# Patient Record
Sex: FEMALE | Race: WHITE | Employment: OTHER | URBAN - NONMETROPOLITAN AREA
[De-identification: names, ages, dates, MRNs, and addresses within clinical notes are randomized per-mention and may not be internally consistent; named-entity substitution may affect disease eponyms.]

---

## 2020-09-24 RX ORDER — OMEPRAZOLE 20 MG/1
20 CAPSULE, DELAYED RELEASE ORAL DAILY
COMMUNITY

## 2020-09-24 RX ORDER — CHLORAL HYDRATE 500 MG
3000 CAPSULE ORAL 3 TIMES DAILY
COMMUNITY
End: 2020-09-29 | Stop reason: ALTCHOICE

## 2020-09-24 RX ORDER — CLONIDINE HYDROCHLORIDE 0.2 MG/1
0.2 TABLET ORAL NIGHTLY
COMMUNITY

## 2020-09-24 RX ORDER — ALBUTEROL SULFATE 90 UG/1
2 AEROSOL, METERED RESPIRATORY (INHALATION) EVERY 6 HOURS PRN
COMMUNITY
End: 2020-09-29 | Stop reason: ALTCHOICE

## 2020-09-24 RX ORDER — FLUTICASONE PROPIONATE 50 MCG
1 SPRAY, SUSPENSION (ML) NASAL DAILY
COMMUNITY

## 2020-09-24 RX ORDER — MECLIZINE HYDROCHLORIDE 25 MG/1
25 TABLET ORAL 3 TIMES DAILY PRN
COMMUNITY
End: 2020-09-29 | Stop reason: ALTCHOICE

## 2020-09-24 RX ORDER — GLIPIZIDE 10 MG/1
10 TABLET, FILM COATED, EXTENDED RELEASE ORAL 2 TIMES DAILY
COMMUNITY

## 2020-09-24 RX ORDER — OXYBUTYNIN CHLORIDE 5 MG/1
5 TABLET, EXTENDED RELEASE ORAL DAILY
COMMUNITY

## 2020-09-24 RX ORDER — LEVOTHYROXINE SODIUM 0.12 MG/1
125 TABLET ORAL DAILY
COMMUNITY

## 2020-09-24 RX ORDER — ASPIRIN 81 MG/1
81 TABLET ORAL DAILY
COMMUNITY

## 2020-09-24 SDOH — HEALTH STABILITY: MENTAL HEALTH: HOW OFTEN DO YOU HAVE A DRINK CONTAINING ALCOHOL?: NEVER

## 2020-09-29 ENCOUNTER — OFFICE VISIT (OUTPATIENT)
Dept: CARDIOLOGY | Age: 85
End: 2020-09-29
Payer: MEDICARE

## 2020-09-29 VITALS
SYSTOLIC BLOOD PRESSURE: 168 MMHG | BODY MASS INDEX: 34.53 KG/M2 | DIASTOLIC BLOOD PRESSURE: 82 MMHG | HEIGHT: 67 IN | WEIGHT: 220 LBS

## 2020-09-29 PROCEDURE — 99203 OFFICE O/P NEW LOW 30 MIN: CPT | Performed by: INTERNAL MEDICINE

## 2020-09-29 RX ORDER — INSULIN ASPART 100 [IU]/ML
INJECTION, SOLUTION INTRAVENOUS; SUBCUTANEOUS
COMMUNITY

## 2020-09-29 RX ORDER — HYDROCHLOROTHIAZIDE 25 MG/1
25 TABLET ORAL DAILY
COMMUNITY

## 2020-09-29 RX ORDER — CARVEDILOL 25 MG/1
25 TABLET ORAL 2 TIMES DAILY
Qty: 60 TABLET | Refills: 5 | Status: SHIPPED | OUTPATIENT
Start: 2020-09-29 | End: 2021-02-04 | Stop reason: SDUPTHER

## 2020-09-29 RX ORDER — LISINOPRIL 40 MG/1
40 TABLET ORAL DAILY
COMMUNITY

## 2020-09-29 NOTE — PROGRESS NOTES
Cardiology Office Visit Note  [facility]  [id number]  Phone: [phone]  Fax: [phone]                            Date:  9/29/2020  Patient: Bora Flowers  Age:  80 y.o., 1934    Referral: Brigitte Farooq MD        PRESENTATION: Bora Flowers is a 80y.o. year old female Is known to have history of type 2 diabetes, hypertension, hyperlipidemia, GERD, hypothyroidism    She was referred to establish with cardiology and to help manage her uncontrolled hypertension  She takes lisinopril, hydrochlorothiazide and beta-blocker for her blood pressure management, also she takes clonidine but her blood pressure still uncontrolled    She denies any active chest pain or shortness of breath or leg swelling  She is a former smoker and does not drink alcohol      REVIEW OF SYSTEMS:  Review of Systems   Constitutional: Positive for fatigue. Respiratory: Negative. Cardiovascular: Negative. Gastrointestinal: Negative. Endocrine: Negative. Genitourinary: Negative. Musculoskeletal: Negative. Skin: Negative. Neurological: Negative. Hematological: Negative. All other systems reviewed and are negative.       Past Medical History:      Diagnosis Date    Abdominal pain     Ankle fracture     Cellulitis     Chest pain     Diabetes (HCC)     Dizziness     Dorsalgia     Essential hypertension     Hyperlipidemia     Hypothyroidism     Low back pain     Seasonal allergies        Past Surgical History:      Procedure Laterality Date    ANKLE FRACTURE SURGERY      DILATION AND CURETTAGE OF UTERUS      FEMUR FRACTURE SURGERY      THYROIDECTOMY         Medications:  Current Outpatient Medications   Medication Sig Dispense Refill    lisinopril (PRINIVIL;ZESTRIL) 40 MG tablet Take 40 mg by mouth daily      insulin aspart (NOVOLOG FLEXPEN) 100 UNIT/ML injection pen Inject into the skin 3 times daily (before meals)      sertraline (ZOLOFT) 50 MG tablet murmur. Pulmonary:      Effort: Pulmonary effort is normal.      Breath sounds: Normal breath sounds. Abdominal:      General: Bowel sounds are normal.      Palpations: Abdomen is soft. Tenderness: There is no abdominal tenderness. Musculoskeletal: Normal range of motion. Right lower leg: No edema. Left lower leg: No edema. Skin:     General: Skin is warm. Neurological:      General: No focal deficit present. Mental Status: She is alert and oriented to person, place, and time. Psychiatric:         Mood and Affect: Mood normal.         Behavior: Behavior normal.         Labs:   CBC: No results found for: CBC   BMP: No results found for: BMP    BNP: No results found for: BNPINT   PT/INR: No results found for: PROTIME, INR, INR    APTT:  No results found for: APTT   CARDIAC ENZYMES: No results found for: CKTOTAL, CKMB, CKMBINDEX, TROPONINI   FASTING LIPID PANEL: No results found for: LIPIDPAN  LIVER PROFILE: No results found for: AST, ALT, LABALBU         Imaging:    - EKG :  - Holter :  - Echo :  - Carotid US   - CT scan finding :  - Cath :      ASSESSMENT and PLAN:    Uncontrolled hypertension  Patient is already on triple therapy including hydrochlorothiazide  Will increase her Coreg to 25 mg BID  Call in 2 weeks to report BP readings     Continue with statin hyper lipidemia       Electronically signed by Madeleine Santos MD on 9/29/2020 at 2:01 PM    Madeleine Santos MD, [facility]  Interventional Cardiologist, Endovascular Specialist   Medical Director, Zeferino Zapata     This dictation was generated by voice recognition computer software. Although all attempts are made to edit the dictation for accuracy, there may be errors in the transcription that are not intended.

## 2020-10-27 PROBLEM — E11.9 TYPE 2 DIABETES MELLITUS (HCC): Status: ACTIVE | Noted: 2020-10-27

## 2020-10-27 PROBLEM — E78.5 HYPERLIPIDEMIA: Status: ACTIVE | Noted: 2020-10-27

## 2020-10-27 PROBLEM — I10 ESSENTIAL HYPERTENSION: Status: ACTIVE | Noted: 2020-10-27

## 2020-10-27 ASSESSMENT — ENCOUNTER SYMPTOMS
GASTROINTESTINAL NEGATIVE: 1
RESPIRATORY NEGATIVE: 1

## 2021-01-27 ENCOUNTER — OFFICE VISIT (OUTPATIENT)
Dept: CARDIOLOGY CLINIC | Age: 86
End: 2021-01-27
Payer: MEDICARE

## 2021-01-27 VITALS
WEIGHT: 225 LBS | SYSTOLIC BLOOD PRESSURE: 158 MMHG | DIASTOLIC BLOOD PRESSURE: 83 MMHG | BODY MASS INDEX: 35.31 KG/M2 | HEART RATE: 66 BPM | HEIGHT: 67 IN

## 2021-01-27 DIAGNOSIS — I10 ESSENTIAL HYPERTENSION: Primary | ICD-10-CM

## 2021-01-27 PROCEDURE — 1090F PRES/ABSN URINE INCON ASSESS: CPT | Performed by: INTERNAL MEDICINE

## 2021-01-27 PROCEDURE — 1036F TOBACCO NON-USER: CPT | Performed by: INTERNAL MEDICINE

## 2021-01-27 PROCEDURE — 93000 ELECTROCARDIOGRAM COMPLETE: CPT | Performed by: INTERNAL MEDICINE

## 2021-01-27 PROCEDURE — G8417 CALC BMI ABV UP PARAM F/U: HCPCS | Performed by: INTERNAL MEDICINE

## 2021-01-27 PROCEDURE — G8484 FLU IMMUNIZE NO ADMIN: HCPCS | Performed by: INTERNAL MEDICINE

## 2021-01-27 PROCEDURE — 99213 OFFICE O/P EST LOW 20 MIN: CPT | Performed by: INTERNAL MEDICINE

## 2021-01-27 PROCEDURE — G8427 DOCREV CUR MEDS BY ELIG CLIN: HCPCS | Performed by: INTERNAL MEDICINE

## 2021-01-27 PROCEDURE — 1123F ACP DISCUSS/DSCN MKR DOCD: CPT | Performed by: INTERNAL MEDICINE

## 2021-01-27 PROCEDURE — 4040F PNEUMOC VAC/ADMIN/RCVD: CPT | Performed by: INTERNAL MEDICINE

## 2021-01-27 RX ORDER — HYDRALAZINE HYDROCHLORIDE 50 MG/1
50 TABLET, FILM COATED ORAL 3 TIMES DAILY
Qty: 90 TABLET | Refills: 3 | Status: SHIPPED | OUTPATIENT
Start: 2021-01-27 | End: 2021-03-01

## 2021-01-27 NOTE — PROGRESS NOTES
HISTORY  35-year-old lady with a history of dyslipidemia, type 2 diabetes, chronic kidney disease, past tobacco abuse, and resistant hypertension returns for follow-up. Most recent antihypertensive therapy has consisted of lisinopril 40 mg, hydrochlorothiazide 25 mg, clonidine zero 0.2 twice daily, and titrated at the time of her last visit carvedilol 25 twice daily. She has difficulty driving when she takes clonidine in the daytime and hips on return today did not take her 0 point 2 AM dose. She relates occasionally awakening abruptly at night with a dry mouth/gasping and has daytime somnolence. History of some past lower extremity swelling. PHYSICAL EXAM  On exam she carries 225 pounds in a 5 foot 7 inch frame. Pressure is 159/87 and pulse of 69. Obese very pleasant elderly lady with significant hearing deficit. EOMs full, sclerae and conjunctiva normal. PERRLA. Mask in place. Trachea midline with no neck masses. Assessment of internal jugular veins reveals no elevation of central venous pressure at 45 degrees. Carotid pulses normal without delay or bruit. Thyroid normal to palpation. Chest exam reveals normal respiratory effort, no abnormal breath sounds and normal expiratory phase. No skin lesions seen. PMI normal. S1, S2 normal without murmur or sara or click. Obese abdomen. Normal bowel sounds without palpable mass or bruit. No clubbing or acrocyanosis. No significant lower extremity edema or signs of venous insufficiency. General motor strength appears to be within normal limits. Normal range of motion with normal gait. Alert, oriented x 3, memory and cognition normal as reflected by history and conversation. EKG reveals a sinus rhythm with inferior Q's and poor R wave progression and nonspecific ST-T wave abnormalities. ASSESSMENT/PLAN:   1. Hypertension -resistant hypertension in the face of probable obstructive sleep apnea and significant chronic kidney disease [creatinine 2.0].   With past history of lower extremity swelling will avoid amlodipine and nifedipine and with creatinine of 2, avoid spironolactone. Will add hydralazine 50 mg 3 times daily with follow-up visit in 1 month. Additionally will attempt to obtain a sleep study if it can be done at [facility] driving to [place] [address]. 2.  Chronic kidney disease -advised to avoid using Motrin on a regular basis as she is at this point for her low back pain    Medical records reviewed prior to today's clinic visit including visually reviewing recent diagnostic studies such as ECHOs, labs, and angiograms as well as reading previous encounter notes. More than 15 minutes spent face-to-face with patient in evaluating, and carefully explaining problems and the planned approach and the reasons behind the decisions.

## 2021-02-04 RX ORDER — CARVEDILOL 25 MG/1
25 TABLET ORAL 2 TIMES DAILY
Qty: 60 TABLET | Refills: 5 | Status: SHIPPED | OUTPATIENT
Start: 2021-02-04 | End: 2021-08-12

## 2021-03-01 ENCOUNTER — OFFICE VISIT (OUTPATIENT)
Dept: CARDIOLOGY CLINIC | Age: 86
End: 2021-03-01
Payer: MEDICARE

## 2021-03-01 VITALS
SYSTOLIC BLOOD PRESSURE: 134 MMHG | HEIGHT: 67 IN | BODY MASS INDEX: 35.47 KG/M2 | HEART RATE: 65 BPM | WEIGHT: 226 LBS | DIASTOLIC BLOOD PRESSURE: 88 MMHG

## 2021-03-01 DIAGNOSIS — N18.30 STAGE 3 CHRONIC KIDNEY DISEASE, UNSPECIFIED WHETHER STAGE 3A OR 3B CKD (HCC): ICD-10-CM

## 2021-03-01 DIAGNOSIS — I10 ESSENTIAL HYPERTENSION: Primary | ICD-10-CM

## 2021-03-01 PROCEDURE — 99213 OFFICE O/P EST LOW 20 MIN: CPT | Performed by: CLINICAL NURSE SPECIALIST

## 2021-03-01 PROCEDURE — 4040F PNEUMOC VAC/ADMIN/RCVD: CPT | Performed by: CLINICAL NURSE SPECIALIST

## 2021-03-01 PROCEDURE — G8417 CALC BMI ABV UP PARAM F/U: HCPCS | Performed by: CLINICAL NURSE SPECIALIST

## 2021-03-01 PROCEDURE — 1090F PRES/ABSN URINE INCON ASSESS: CPT | Performed by: CLINICAL NURSE SPECIALIST

## 2021-03-01 PROCEDURE — 1036F TOBACCO NON-USER: CPT | Performed by: CLINICAL NURSE SPECIALIST

## 2021-03-01 PROCEDURE — G8484 FLU IMMUNIZE NO ADMIN: HCPCS | Performed by: CLINICAL NURSE SPECIALIST

## 2021-03-01 PROCEDURE — G8427 DOCREV CUR MEDS BY ELIG CLIN: HCPCS | Performed by: CLINICAL NURSE SPECIALIST

## 2021-03-01 PROCEDURE — 1123F ACP DISCUSS/DSCN MKR DOCD: CPT | Performed by: CLINICAL NURSE SPECIALIST

## 2021-03-01 RX ORDER — HYDRALAZINE HYDROCHLORIDE 50 MG/1
50 TABLET, FILM COATED ORAL 2 TIMES DAILY
Qty: 180 TABLET | Refills: 3
Start: 2021-03-01 | End: 2021-08-23 | Stop reason: SDUPTHER

## 2021-03-01 ASSESSMENT — ENCOUNTER SYMPTOMS
COUGH: 0
NAUSEA: 0
EYE REDNESS: 0
CHEST TIGHTNESS: 0
WHEEZING: 0
ABDOMINAL PAIN: 0
FACIAL SWELLING: 0
SHORTNESS OF BREATH: 1
VOMITING: 0

## 2021-03-01 NOTE — PROGRESS NOTES
[facility], [address] [place] [address] [address] [place] [address], [email or web address] [id number]  Phone: [phone]  Fax: [phone]    OFFICE VISIT:  3/1/2021    Connie Zamarripa - : 1934    Reason For Visit:  Bo Angelo is a 80 y.o. female who is here for 1 Month Follow-Up (Patient c/o SOB and BP follow up )       Diagnosis Orders   1. Essential hypertension  hydrALAZINE (APRESOLINE) 50 MG tablet   2. Stage 3 chronic kidney disease, unspecified whether stage 3a or 3b CKD           HPI  Patient is here for follow-up today for resistant hypertension, CKD, diabetes. At last visit Dr. Fabian Suarez added hydralazine 50 mg 3 times daily. Calcium channel blockers were avoided due to history of lower extremity edema. Last creatinine 1.8 in Sept under Media    Patient states she is only able to tolerate this medicine twice per day. She thinks that taking it 3 times a day keeps her up at night and affects her sleep. She states she does fine if she takes it just twice a day. She does notice some improvement in her blood pressure. She might see high reading once every 2 weeks or so    She is generally fatigued and chronically short of breath without change. No signs of fluid overload such as sudden weight gain, orthopnea, PND or significant edema. She denies chest pain       Dena Aase, MD is PCP.   Connie Zamarripa has the following history as recorded in [facility]:    Patient Active Problem List    Diagnosis Date Noted    Essential hypertension 10/27/2020    Hyperlipidemia 10/27/2020    Type 2 diabetes mellitus ([address] 10/27/2020     Past Medical History:   Diagnosis Date    Abdominal pain     Ankle fracture     Cellulitis     Chest pain     Diabetes (HCC)     Dizziness     Dorsalgia     Essential hypertension     Hyperlipidemia     Hypothyroidism     Low back pain     Seasonal allergies      Past Surgical History:   Procedure Laterality Date    ANKLE FRACTURE SURGERY      DILATION AND CURETTAGE OF UTERUS      FEMUR FRACTURE SURGERY      THYROIDECTOMY       Family History   Problem Relation Age of Onset    Cervical Cancer Mother     Stroke Mother     Hypertension Mother    Romina Officer Arthritis Mother     Diabetes Mother     Breast Cancer Sister      Social History     Tobacco Use    Smoking status: Former Smoker     Quit date:      Years since quittin.1    Smokeless tobacco: Never Used   Substance Use Topics    Alcohol use: Never     Frequency: Never      Current Outpatient Medications   Medication Sig Dispense Refill    hydrALAZINE (APRESOLINE) 50 MG tablet Take 1 tablet by mouth 2 times daily 180 tablet 3    carvedilol (COREG) 25 MG tablet Take 1 tablet by mouth 2 times daily 60 tablet 5    lisinopril (PRINIVIL;ZESTRIL) 40 MG tablet Take 40 mg by mouth daily      insulin aspart (NOVOLOG FLEXPEN) 100 UNIT/ML injection pen Inject into the skin 3 times daily (before meals)      sertraline (ZOLOFT) 50 MG tablet Take 50 mg by mouth daily      hydroCHLOROthiazide (HYDRODIURIL) 25 MG tablet Take 25 mg by mouth daily      oxybutynin (DITROPAN-XL) 5 MG extended release tablet Take 5 mg by mouth daily      aspirin 81 MG EC tablet Take 81 mg by mouth daily      fluticasone (FLONASE) 50 MCG/ACT nasal spray 1 spray by Each Nostril route daily      omeprazole (PRILOSEC) 20 MG delayed release capsule Take 20 mg by mouth daily      glipiZIDE (GLUCOTROL XL) 10 MG extended release tablet Take 10 mg by mouth daily      cloNIDine (CATAPRES) 0.2 MG tablet Take 0.2 mg by mouth 2 times daily       levothyroxine (SYNTHROID) 125 MCG tablet Take 125 mcg by mouth Daily      EASY TOUCH TEST strip USE AS DIRECTED. 60 strip 11    simvastatin (ZOCOR) 40 MG tablet TAKE 1 TABLET DAILY AT BEDTIME 30 tablet 11     No current facility-administered medications for this visit. Allergies: Quinidine    Review of Systems  Review of Systems   Constitutional: Positive for fatigue (chronic).  Negative for activity change, diaphoresis, fever and unexpected weight change. HENT: Negative for facial swelling and nosebleeds. Eyes: Negative for redness and visual disturbance. Respiratory: Positive for shortness of breath (chronic, unchanged). Negative for cough, chest tightness and wheezing. Cardiovascular: Negative for chest pain, palpitations and leg swelling. Gastrointestinal: Negative for abdominal pain, nausea and vomiting. Endocrine: Negative for cold intolerance and heat intolerance. Genitourinary: Negative for dysuria and hematuria. Musculoskeletal: Negative for arthralgias and myalgias. Skin: Negative for pallor and rash. Neurological: Negative for dizziness, seizures, syncope, weakness and light-headedness. Hematological: Does not bruise/bleed easily. Psychiatric/Behavioral: Negative for agitation. The patient is not nervous/anxious. Objective  Vital Signs - /88   Pulse 65   Ht 5' 7\" (1.702 m)   Wt 226 lb (102.5 kg)   BMI 35.40 kg/m²    Wt Readings from Last 3 Encounters:   03/01/21 226 lb (102.5 kg)   01/27/21 225 lb (102.1 kg)   09/29/20 220 lb (99.8 kg)        BP Readings from Last 3 Encounters:   03/01/21 134/88   01/27/21 (!) 158/83   09/29/20 (!) 168/82    Pulse Readings from Last 3 Encounters:   03/01/21 65   01/27/21 66        Physical Exam  Vitals signs and nursing note reviewed. Constitutional:       General: She is not in acute distress. Appearance: She is well-developed. She is not diaphoretic. Comments: Deconditioned   HENT:      Head: Normocephalic and atraumatic. Right Ear: Hearing and external ear normal.      Left Ear: Hearing and external ear normal.      Nose: Nose normal.   Eyes:      General:         Right eye: No discharge. Left eye: No discharge. Pupils: Pupils are equal, round, and reactive to light. Neck:      Musculoskeletal: Neck supple. No muscular tenderness. Thyroid: No thyromegaly. Vascular: No carotid bruit or JVD. Trachea: No tracheal deviation. Cardiovascular:      Rate and Rhythm: Normal rate and regular rhythm. Heart sounds: Normal heart sounds. No murmur. No friction rub. No gallop. Pulmonary:      Effort: Pulmonary effort is normal. No respiratory distress. Breath sounds: Normal breath sounds. No wheezing or rales. Abdominal:      Palpations: Abdomen is soft. Tenderness: There is no abdominal tenderness. Musculoskeletal:         General: No swelling or deformity. Right lower leg: Edema (Trace) present. Left lower leg: Edema (Trace) present. Comments: Unsteady gait, ambulates with cane   Skin:     General: Skin is warm and dry. Findings: No rash. Neurological:      General: No focal deficit present. Mental Status: She is alert and oriented to person, place, and time. Cranial Nerves: No cranial nerve deficit. Psychiatric:         Mood and Affect: Mood normal.         Behavior: Behavior normal.         Judgment: Judgment normal.         Data:   PATRICIA 2016- neg for ischemia    Assessment:     Diagnosis Orders   1. Essential hypertension  hydrALAZINE (APRESOLINE) 50 MG tablet   2. Stage 3 chronic kidney disease, unspecified whether stage 3a or 3b CKD         Hypertension-better BP control with hydralazine, but only able to tolerate twice a day. Stressed low-sodium diet. We will continue present treatment    CKD-last creatinine 1.8 in September 2020. Continue to monitor    Stable cardiovascular status. No evidence of overt heart failure,angina or dysrhythmia. Plan    Return in about 6 months (around 9/1/2021) for APRN. Ok to decrease Hydralazine to 50mg twice a day  Low salt diet    Call with any questionsor concerns  Follow up with Lesley Shukla MD for non cardiac problems  Report any new problems  Cardiovascular Fitness-Exercise as tolerated. Strive for 15 minutes of exercise most days of the week.     Cardiac / HealthyDiet  Continue current medications as directed  Continue plan of treatment  It is always recommended that you bring your medicationsbottles with you to each visit - this is for your safety!        Yessi Lagunas, APRN

## 2021-03-01 NOTE — PATIENT INSTRUCTIONS
Return in about 6 months (around 9/1/2021) for APRN. Ok to decrease Hydralazine to 50mg twice a day  Low salt diet    Patient Education        Low Sodium Diet (2,000 Milligram): Care Instructions  Your Care Instructions     Too much sodium causes your body to hold on to extra water. This can raise your blood pressure and force your heart and kidneys to work harder. In very serious cases, this could cause you to be put in the hospital. It might even be life-threatening. By limiting sodium, you will feel better and lower your risk of serious problems. The most common source of sodium is salt. People get most of the salt in their diet from canned, prepared, and packaged foods. Fast food and restaurant meals also are very high in sodium. Your doctor will probably limit your sodium to less than 2,000 milligrams (mg) a day. This limit counts all the sodium in prepared and packaged foods and any salt you add to your food. Follow-up care is a key part of your treatment and safety. Be sure to make and go to all appointments, and call your doctor if you are having problems. It's also a good idea to know your test results and keep a list of the medicines you take. How can you care for yourself at home? Read food labels  · Read labels on cans and food packages. The labels tell you how much sodium is in each serving. Make sure that you look at the serving size. If you eat more than the serving size, you have eaten more sodium. · Food labels also tell you the Percent Daily Value for sodium. Choose products with low Percent Daily Values for sodium. · Be aware that sodium can come in forms other than salt, including monosodium glutamate (MSG), sodium citrate, and sodium bicarbonate (baking soda). MSG is often added to Asian food. When you eat out, you can sometimes ask for food without MSG or added salt.   Buy low-sodium foods  · Buy foods that are labeled \"unsalted\" (no salt added), \"sodium-free\" (less than 5 mg of sodium per serving), or \"low-sodium\" (less than 140 mg of sodium per serving). Foods labeled \"reduced-sodium\" and \"light sodium\" may still have too much sodium. Be sure to read the label to see how much sodium you are getting. · Buy fresh vegetables, or frozen vegetables without added sauces. Buy low-sodium versions of canned vegetables, soups, and other canned goods. Prepare low-sodium meals  · Cut back on the amount of salt you use in cooking. This will help you adjust to the taste. Do not add salt after cooking. One teaspoon of salt has about 2,300 mg of sodium. · Take the salt shaker off the table. · Flavor your food with garlic, lemon juice, onion, vinegar, herbs, and spices. Do not use soy sauce, lite soy sauce, steak sauce, onion salt, garlic salt, celery salt, mustard, or ketchup on your food. · Use low-sodium salad dressings, sauces, and ketchup. Or make your own salad dressings and sauces without adding salt. · Use less salt (or none) when recipes call for it. You can often use half the salt a recipe calls for without losing flavor. Other foods such as rice, pasta, and grains do not need added salt. · Rinse canned vegetables, and cook them in fresh water. This removes some--but not all--of the salt. · Avoid water that is naturally high in sodium or that has been treated with water softeners, which add sodium. Call your local water company to find out the sodium content of your water supply. If you buy bottled water, read the label and choose a sodium-free brand. Avoid high-sodium foods  · Avoid eating:  ? Smoked, cured, salted, and canned meat, fish, and poultry. ? Ham, smith, hot dogs, and luncheon meats. ? Regular, hard, and processed cheese and regular peanut butter. ? Crackers with salted tops, and other salted snack foods such as pretzels, chips, and salted popcorn. ? Frozen prepared meals, unless labeled low-sodium. ?  Canned and dried soups, broths, and bouillon, unless labeled sodium-free or

## 2021-08-12 RX ORDER — CARVEDILOL 25 MG/1
TABLET ORAL
Qty: 60 TABLET | Refills: 5 | Status: SHIPPED | OUTPATIENT
Start: 2021-08-12 | End: 2022-01-27

## 2021-08-23 ENCOUNTER — TELEPHONE (OUTPATIENT)
Dept: CARDIOLOGY CLINIC | Age: 86
End: 2021-08-23

## 2021-08-23 ENCOUNTER — VIRTUAL VISIT (OUTPATIENT)
Dept: CARDIOLOGY CLINIC | Age: 86
End: 2021-08-23
Payer: MEDICARE

## 2021-08-23 DIAGNOSIS — N18.30 STAGE 3 CHRONIC KIDNEY DISEASE, UNSPECIFIED WHETHER STAGE 3A OR 3B CKD (HCC): ICD-10-CM

## 2021-08-23 DIAGNOSIS — R09.89 LABILE HYPERTENSION: Primary | ICD-10-CM

## 2021-08-23 DIAGNOSIS — R53.1 SPELL OF GENERALIZED WEAKNESS: ICD-10-CM

## 2021-08-23 PROCEDURE — 4040F PNEUMOC VAC/ADMIN/RCVD: CPT | Performed by: CLINICAL NURSE SPECIALIST

## 2021-08-23 PROCEDURE — 99443 PR PHYS/QHP TELEPHONE EVALUATION 21-30 MIN: CPT | Performed by: CLINICAL NURSE SPECIALIST

## 2021-08-23 PROCEDURE — G8427 DOCREV CUR MEDS BY ELIG CLIN: HCPCS | Performed by: CLINICAL NURSE SPECIALIST

## 2021-08-23 PROCEDURE — 1123F ACP DISCUSS/DSCN MKR DOCD: CPT | Performed by: CLINICAL NURSE SPECIALIST

## 2021-08-23 PROCEDURE — 1090F PRES/ABSN URINE INCON ASSESS: CPT | Performed by: CLINICAL NURSE SPECIALIST

## 2021-08-23 RX ORDER — ERGOCALCIFEROL 1.25 MG/1
50000 CAPSULE ORAL WEEKLY
COMMUNITY

## 2021-08-23 RX ORDER — HYDRALAZINE HYDROCHLORIDE 50 MG/1
50 TABLET, FILM COATED ORAL 2 TIMES DAILY
Qty: 180 TABLET | Refills: 3 | Status: SHIPPED | OUTPATIENT
Start: 2021-08-23

## 2021-08-23 ASSESSMENT — ENCOUNTER SYMPTOMS
CHEST TIGHTNESS: 0
EYE REDNESS: 0
WHEEZING: 0
VOMITING: 0
ABDOMINAL PAIN: 0
FACIAL SWELLING: 0
SHORTNESS OF BREATH: 0
NAUSEA: 0
COUGH: 0

## 2021-08-23 NOTE — PROGRESS NOTES
Lemuel Bernstein is a 80 y.o. female evaluated via telephone on 2021. Consent:  She and/or health care decision maker is aware that that she may receive a bill for this telephone service, depending on her insurance coverage, and has provided verbal consent to proceed: Yes      Documentation:  I communicated with the patient and/or health care decision maker about HTN. Details of this discussion including any medical advice provided      I affirm this is a Patient Initiated Episode with an Established Patient who has not had a related appointment within my department in the past 7 days or scheduled within the next 24 hours. Total Time: minutes: 21-30 minutes    Note: not billable if this call serves to triage the patient into an appointment for the relevant concern      COURTNEY Dyer      2021    TELEHEALTH EVALUATION -- Audio/Telephone (During  public health emergency)  This service was provided through telehealth, by COURTNEY Nolasco at [facility] in [place], [place] and the patient in his/her home via telephone call. Medical Assistant reviewed current medications, pertinent history, and reason for visit. Diagnosis Orders   1. Labile hypertension     2. Spell of generalized weakness     3. Stage 3 chronic kidney disease, unspecified whether stage 3a or 3b CKD (HCC)        HPI:    Lemuel Bernstein (:  1934) has requested an telephone evaluation for the following concern(s):    Patient follows with our office with a history of resistant hypertension, CKD. Calcium channel blockers were avoided due to history of lower extremity edema    She called the office today with concerns of her blood pressure being both high and low. She has weak spells several times per week with associated blurred vision. She denies any associated palpitations or fast heart rate. She denies dizziness, lightheadedness.   She lives alone, but her daughter was in visiting when she had a spell today and she states her blood pressure was as low as 88/44. Later today her blood pressure has been as high as 200/90. Spoke with both patient and daughter by phone      Review of Systems   Constitutional: Positive for fatigue. Negative for activity change, diaphoresis, fever and unexpected weight change. HENT: Negative for facial swelling and nosebleeds. Eyes: Positive for visual disturbance (with weak spells). Negative for redness. Respiratory: Negative for cough, chest tightness, shortness of breath and wheezing. Cardiovascular: Negative for chest pain, palpitations and leg swelling. Complains of both high and low blood pressure   Gastrointestinal: Negative for abdominal pain, nausea and vomiting. Endocrine: Negative for cold intolerance and heat intolerance. Genitourinary: Negative for dysuria and hematuria. Musculoskeletal: Negative for arthralgias and myalgias. Skin: Negative for pallor and rash. Neurological: Positive for weakness (spells). Negative for dizziness, seizures, syncope and light-headedness. Hematological: Does not bruise/bleed easily. Psychiatric/Behavioral: Negative for agitation. The patient is not nervous/anxious. Prior to Visit Medications    Medication Sig Taking?  Authorizing Provider   vitamin D (ERGOCALCIFEROL) 1.25 MG (71439 UT) CAPS capsule Take 50,000 Units by mouth once a week Yes Historical Provider, MD   hydrALAZINE (APRESOLINE) 50 MG tablet Take 1 tablet by mouth 2 times daily Yes COURTNEY Pan   carvedilol (COREG) 25 MG tablet TAKE ONE TABLET BY MOUTH TWICE DAILY Yes COURTNYE Ventura - NP   lisinopril (PRINIVIL;ZESTRIL) 40 MG tablet Take 40 mg by mouth daily Yes Historical Provider, MD   insulin aspart (NOVOLOG FLEXPEN) 100 UNIT/ML injection pen Inject into the skin 3 times daily (before meals) Yes Historical Provider, MD   sertraline (ZOLOFT) 50 MG tablet Take 50 mg by mouth daily Yes Historical Provider, MD hydroCHLOROthiazide (HYDRODIURIL) 25 MG tablet Take 25 mg by mouth daily Yes Historical Provider, MD   oxybutynin (DITROPAN-XL) 5 MG extended release tablet Take 5 mg by mouth daily Yes Historical Provider, MD   aspirin 81 MG EC tablet Take 81 mg by mouth daily Yes Historical Provider, MD   fluticasone (FLONASE) 50 MCG/ACT nasal spray 1 spray by Each Nostril route daily Yes Historical Provider, MD   omeprazole (PRILOSEC) 20 MG delayed release capsule Take 20 mg by mouth daily Yes Historical Provider, MD   glipiZIDE (GLUCOTROL XL) 10 MG extended release tablet Take 10 mg by mouth 2 times daily  Yes Historical Provider, MD   cloNIDine (CATAPRES) 0.2 MG tablet Take 0.2 mg by mouth nightly Yes Historical Provider, MD   levothyroxine (SYNTHROID) 125 MCG tablet Take 125 mcg by mouth Daily Yes Historical Provider, MD   EASY TOUCH TEST strip USE AS DIRECTED.  Yes B Cheryl Cast, DO   simvastatin (ZOCOR) 40 MG tablet TAKE 1 TABLET DAILY AT BEDTIME Yes B Cheryl Cast, DO       Social History     Tobacco Use    Smoking status: Former Smoker     Quit date:      Years since quittin.6    Smokeless tobacco: Never Used   Vaping Use    Vaping Use: Former   Substance Use Topics    Alcohol use: Never    Drug use: Never        Allergies   Allergen Reactions    Quinidine        Past Medical History:   Diagnosis Date    Abdominal pain     Ankle fracture     Cellulitis     Chest pain     Diabetes ([address]     Dizziness     Dorsalgia     Essential hypertension     Hyperlipidemia     Hypothyroidism     Low back pain     Seasonal allergies        Past Surgical History:   Procedure Laterality Date    ANKLE FRACTURE SURGERY      DILATION AND CURETTAGE OF UTERUS      FEMUR FRACTURE SURGERY      THYROIDECTOMY         Family History   Problem Relation Age of Onset    Cervical Cancer Mother     Stroke Mother     Hypertension Mother    [employer] Arthritis Mother     Diabetes Mother     Breast Cancer Sister PHYSICAL EXAMINATION:  Vital Signs: (As obtained by patient/caregiver or practitioner observation)    Blood pressure- 182/84,  88/40 Heart rate- 74         DATA:    ASSESSMENT/PLAN:  1. Labile hypertension  Blood pressure both high and low with associated weak spells. Will decrease clonidine from 0.2 mg twice a day to 0.2 mg once a day at bedtime only. She expresses displeasure with hydralazine as it is chalky and difficult to swallow. Will continue this for the time being    2. Spell of generalized weakness  Patient will come in on Wednesday to have a 1 week Zio patch heart monitor lites to rule out any arrhythmias, significant bradycardia or heart block    3. Stage 3 chronic kidney disease, unspecified whether stage 3a or 3b CKD ([facility]  Monitored by PCP      Return for APRN, as scheduled. in Sept  Patient to come in on Wednesday, 8/25/2021 to have a 1 week Zio patch heart monitor placed    An  electronic signature was used to authenticate this note. --COURTNEY Allen on 8/23/2021 at 3:50 PM        Pursuant to the emergency declaration under the [facility], [facility]5 waiver authority and the [employer] and [employer] Act, this telephone Visit was conducted, with patient's consent, to reduce the patient's risk of exposure to COVID-19 and provide continuity of care for an established patient. Services were provided through a telephone discussion virtually to substitute for in-person clinic visit.

## 2021-08-23 NOTE — TELEPHONE ENCOUNTER
Daughter called and lvm stating that pt is on BP meds, but having episodes of low BP. it was 88/44 yesterday. she has had these episodes before. they want to discuss her BP medications with alexandra. Please advise. I can add her on for a VV today or we can put her on the schedule for this week.

## 2021-08-25 ENCOUNTER — TELEPHONE (OUTPATIENT)
Dept: CARDIOLOGY CLINIC | Age: 86
End: 2021-08-25

## 2021-08-25 NOTE — TELEPHONE ENCOUNTER
Pt came in today and ZIO heart monitor was placed. I explained all of the instructions to the pt and the PT's daughter. Pt is to call if she has any other questions.

## 2021-09-10 ENCOUNTER — TELEPHONE (OUTPATIENT)
Dept: CARDIOLOGY CLINIC | Age: 86
End: 2021-09-10

## 2021-09-10 NOTE — TELEPHONE ENCOUNTER
Please let patient know recent heart monitor showed no significant arrhythmias, pauses or heart block.

## 2021-10-11 ENCOUNTER — OFFICE VISIT (OUTPATIENT)
Dept: CARDIOLOGY CLINIC | Age: 86
End: 2021-10-11
Payer: MEDICARE

## 2021-10-11 VITALS
OXYGEN SATURATION: 96 % | HEIGHT: 67 IN | SYSTOLIC BLOOD PRESSURE: 134 MMHG | BODY MASS INDEX: 34.53 KG/M2 | WEIGHT: 220 LBS | DIASTOLIC BLOOD PRESSURE: 74 MMHG | HEART RATE: 80 BPM

## 2021-10-11 DIAGNOSIS — R53.1 SPELL OF GENERALIZED WEAKNESS: ICD-10-CM

## 2021-10-11 DIAGNOSIS — N18.30 STAGE 3 CHRONIC KIDNEY DISEASE, UNSPECIFIED WHETHER STAGE 3A OR 3B CKD (HCC): ICD-10-CM

## 2021-10-11 DIAGNOSIS — R09.89 LABILE HYPERTENSION: Primary | ICD-10-CM

## 2021-10-11 PROCEDURE — 1036F TOBACCO NON-USER: CPT | Performed by: CLINICAL NURSE SPECIALIST

## 2021-10-11 PROCEDURE — 99213 OFFICE O/P EST LOW 20 MIN: CPT | Performed by: CLINICAL NURSE SPECIALIST

## 2021-10-11 PROCEDURE — 1123F ACP DISCUSS/DSCN MKR DOCD: CPT | Performed by: CLINICAL NURSE SPECIALIST

## 2021-10-11 PROCEDURE — 1090F PRES/ABSN URINE INCON ASSESS: CPT | Performed by: CLINICAL NURSE SPECIALIST

## 2021-10-11 PROCEDURE — G8484 FLU IMMUNIZE NO ADMIN: HCPCS | Performed by: CLINICAL NURSE SPECIALIST

## 2021-10-11 PROCEDURE — G8417 CALC BMI ABV UP PARAM F/U: HCPCS | Performed by: CLINICAL NURSE SPECIALIST

## 2021-10-11 PROCEDURE — 4040F PNEUMOC VAC/ADMIN/RCVD: CPT | Performed by: CLINICAL NURSE SPECIALIST

## 2021-10-11 PROCEDURE — G8427 DOCREV CUR MEDS BY ELIG CLIN: HCPCS | Performed by: CLINICAL NURSE SPECIALIST

## 2021-10-11 ASSESSMENT — ENCOUNTER SYMPTOMS
EYE REDNESS: 0
COUGH: 0
WHEEZING: 0
FACIAL SWELLING: 0
ABDOMINAL PAIN: 0
VOMITING: 0
SHORTNESS OF BREATH: 1
NAUSEA: 0
CHEST TIGHTNESS: 0

## 2021-10-11 NOTE — PROGRESS NOTES
[facility] [address] [place] [address]  [address]  Phone: [phone]  Fax: [phone]    OFFICE VISIT:  10/11/2021    Sue Sheldon - : 1934    Reason For Visit:  Eder Rankin is a 80 y.o. female who is here for 6 Month Follow-Up and Hypertension       Diagnosis Orders   1. Labile hypertension     2. Spell of generalized weakness     3. Stage 3 chronic kidney disease, unspecified whether stage 3a or 3b CKD ([facility] [address]           HPI  Patient is here for follow-up today for resistant hypertension, CKD, diabetes. Calcium channel blockers were avoided due to history of lower extremity edema. She had difficulty remembering to take her hydralazine 3 times daily, so therefore the dose was decreased to twice a day. Last creatinine 1.8 in Sept under Media    At her last virtual visit in August, I spoke at length with her daughter because patient is quite hard of hearing by phone. She states she had problems with weak spells and associated loss of vision. There was concern that her blood pressure was too low. She was instructed to take the clonidine just at bedtime only. However, she has been taking it still twice a day. Weak spells have improved she states. She did wear a heart monitor for 1 week. She states she did not really have weak spells while wearing the monitor. She reports she will be moving closer to [facility] to [place] and will not be back for future visits and would like to get medical records       Bharti Diallo MD is PCP.   Sue Sheldon has the following history as recorded in [facility]:    Patient Active Problem List    Diagnosis Date Noted    Essential hypertension 10/27/2020    Hyperlipidemia 10/27/2020    Type 2 diabetes mellitus ([facility] [address] 10/27/2020     Past Medical History:   Diagnosis Date    Abdominal pain     Ankle fracture     Cellulitis     Chest pain     Diabetes (HCC)     Dizziness     Dorsalgia     Essential hypertension     Hyperlipidemia     Hypothyroidism     Low back pain     Seasonal allergies      Past Surgical History:   Procedure Laterality Date    ANKLE FRACTURE SURGERY      DILATION AND CURETTAGE OF UTERUS      FEMUR FRACTURE SURGERY      THYROIDECTOMY       Family History   Problem Relation Age of Onset    Cervical Cancer Mother     Stroke Mother     Hypertension Mother    [facility] Arthritis Mother     Diabetes Mother     Breast Cancer Sister      Social History     Tobacco Use    Smoking status: Former Smoker     Quit date:      Years since quittin.7    Smokeless tobacco: Never Used   Substance Use Topics    Alcohol use: Never      Current Outpatient Medications   Medication Sig Dispense Refill    vitamin D (ERGOCALCIFEROL) 1.25 MG (97608 UT) CAPS capsule Take 50,000 Units by mouth once a week      hydrALAZINE (APRESOLINE) 50 MG tablet Take 1 tablet by mouth 2 times daily 180 tablet 3    carvedilol (COREG) 25 MG tablet TAKE ONE TABLET BY MOUTH TWICE DAILY 60 tablet 5    lisinopril (PRINIVIL;ZESTRIL) 40 MG tablet Take 40 mg by mouth daily      insulin aspart (NOVOLOG FLEXPEN) 100 UNIT/ML injection pen Inject into the skin 3 times daily (before meals)      sertraline (ZOLOFT) 50 MG tablet Take 50 mg by mouth daily      hydroCHLOROthiazide (HYDRODIURIL) 25 MG tablet Take 25 mg by mouth daily      oxybutynin (DITROPAN-XL) 5 MG extended release tablet Take 5 mg by mouth daily      aspirin 81 MG EC tablet Take 81 mg by mouth daily      fluticasone (FLONASE) 50 MCG/ACT nasal spray 1 spray by Each Nostril route daily      omeprazole (PRILOSEC) 20 MG delayed release capsule Take 20 mg by mouth daily      glipiZIDE (GLUCOTROL XL) 10 MG extended release tablet Take 10 mg by mouth 2 times daily       cloNIDine (CATAPRES) 0.2 MG tablet Take 0.2 mg by mouth nightly      levothyroxine (SYNTHROID) 125 MCG tablet Take 125 mcg by mouth Daily      EASY TOUCH TEST strip USE AS DIRECTED.  60 strip 11    simvastatin (ZOCOR) 40 MG tablet TAKE 1 TABLET DAILY AT BEDTIME 30 tablet 11     No current facility-administered medications for this visit. Allergies: Quinidine    Review of Systems  Review of Systems   Constitutional: Positive for fatigue (chronic). Negative for activity change, diaphoresis, fever and unexpected weight change. HENT: Negative for facial swelling and nosebleeds. Eyes: Negative for redness and visual disturbance. Respiratory: Positive for shortness of breath (chronic, unchanged). Negative for cough, chest tightness and wheezing. Cardiovascular: Negative for chest pain, palpitations and leg swelling. Gastrointestinal: Negative for abdominal pain, nausea and vomiting. Endocrine: Negative for cold intolerance and heat intolerance. Genitourinary: Negative for dysuria and hematuria. Musculoskeletal: Negative for arthralgias and myalgias. Skin: Negative for pallor and rash. Neurological: Positive for weakness (spells are better). Negative for dizziness, seizures, syncope and light-headedness. Hematological: Does not bruise/bleed easily. Psychiatric/Behavioral: Negative for agitation. The patient is not nervous/anxious. Objective  Vital Signs - /74   Pulse 80   Ht 5' 7\" (1.702 m)   Wt 220 lb (99.8 kg)   SpO2 96%   BMI 34.46 kg/m²    Wt Readings from Last 3 Encounters:   10/11/21 220 lb (99.8 kg)   03/01/21 226 lb (102.5 kg)   01/27/21 225 lb (102.1 kg)        BP Readings from Last 3 Encounters:   10/11/21 134/74   03/01/21 134/88   01/27/21 (!) 158/83    Pulse Readings from Last 3 Encounters:   10/11/21 80   03/01/21 65   01/27/21 66        Physical Exam  Vitals and nursing note reviewed. Constitutional:       General: She is not in acute distress. Appearance: She is well-developed. She is not diaphoretic. Comments: Deconditioned   HENT:      Head: Normocephalic and atraumatic.       Right Ear: Hearing and external ear normal.      Left Ear: Hearing and external ear normal.      Nose: Nose normal.   Eyes:      General:         Right eye: No discharge. Left eye: No discharge. Pupils: Pupils are equal, round, and reactive to light. Neck:      Thyroid: No thyromegaly. Vascular: No carotid bruit or JVD. Trachea: No tracheal deviation. Cardiovascular:      Rate and Rhythm: Normal rate and regular rhythm. Heart sounds: Normal heart sounds. No murmur heard. No friction rub. No gallop. Pulmonary:      Effort: Pulmonary effort is normal. No respiratory distress. Breath sounds: Normal breath sounds. No wheezing or rales. Abdominal:      Palpations: Abdomen is soft. Tenderness: There is no abdominal tenderness. Musculoskeletal:         General: No swelling or deformity. Cervical back: Neck supple. No muscular tenderness. Right lower leg: Edema (Trace) present. Left lower leg: Edema (Trace) present. Comments: Unsteady gait, ambulates with cane   Skin:     General: Skin is warm and dry. Findings: No rash. Neurological:      General: No focal deficit present. Mental Status: She is alert and oriented to person, place, and time. Cranial Nerves: No cranial nerve deficit. Psychiatric:         Mood and Affect: Mood normal.         Behavior: Behavior normal.         Judgment: Judgment normal.         Data:   PATRICIA 2016- neg for ischemia    Zio patch 8/25/2021 through 9/1/2021  16 brief SVT, longest 17 beats  No A. Fib, pauses, or heart block    Assessment:     Diagnosis Orders   1. Labile hypertension     2. Spell of generalized weakness     3. Stage 3 chronic kidney disease, unspecified whether stage 3a or 3b CKD ([facility] [address]         Hypertension-stable here in the office today, but reports higher readings in the 150- 60 range at home systolically. Problems with weak spells possibly related to hypotension. Decrease the clonidine to 0.2 mg at bedtime only.   She can take an additional clonidine during the day if she has an elevated blood pressure greater than 170/90. Spells of generalized weakness-no significant arrhythmias pauses or heart block noted per recent Zio patch heart monitor. Symptoms could be related to hypotension. Directions as above    CKD-last creatinine 1.8 in September 2020. Continue to monitor    Stable cardiovascular status. No evidence of overt heart failure,angina or dysrhythmia. Plan    Return for APRN, as needed. Patient is moving-establish care with new provider, request records today  Take Clonidine at bedtime only    Call with any questionsor concerns  Follow up with Jay Jay Saldana MD for non cardiac problems  Report any new problems  Cardiovascular Fitness-Exercise as tolerated. Strive for 15 minutes of exercise most days of the week. Cardiac / HealthyDiet  Continue current medications as directed  Continue plan of treatment  It is always recommended that you bring your medicationsbottles with you to each visit - this is for your safety!        Kern Soulier, APRN

## 2022-01-27 RX ORDER — CARVEDILOL 25 MG/1
TABLET ORAL
Qty: 60 TABLET | Refills: 5 | Status: SHIPPED | OUTPATIENT
Start: 2022-01-27